# Patient Record
Sex: FEMALE | Race: WHITE | Employment: FULL TIME | URBAN - METROPOLITAN AREA
[De-identification: names, ages, dates, MRNs, and addresses within clinical notes are randomized per-mention and may not be internally consistent; named-entity substitution may affect disease eponyms.]

---

## 2022-07-02 RX ORDER — KETOCONAZOLE 20 MG/ML
SHAMPOO TOPICAL
COMMUNITY

## 2022-07-02 RX ORDER — CLOBETASOL PROPIONATE 0.05 G/100ML
SHAMPOO TOPICAL
COMMUNITY

## 2022-07-02 RX ORDER — ALPRAZOLAM 0.5 MG/1
TABLET ORAL
COMMUNITY
End: 2022-09-02 | Stop reason: ALTCHOICE

## 2022-07-02 RX ORDER — FAMOTIDINE 10 MG
TABLET ORAL
COMMUNITY

## 2022-07-29 PROBLEM — J45.909 ASTHMA: Status: ACTIVE | Noted: 2022-07-29

## 2022-07-29 PROBLEM — N95.1 MENOPAUSAL AND FEMALE CLIMACTERIC STATES: Status: ACTIVE | Noted: 2022-07-29

## 2022-07-29 PROBLEM — N95.9 MENOPAUSAL AND POSTMENOPAUSAL DISORDER: Status: ACTIVE | Noted: 2022-07-29

## 2022-07-29 PROBLEM — K59.00 CONSTIPATION: Status: ACTIVE | Noted: 2022-07-29

## 2022-07-29 PROBLEM — R92.8 ABNORMAL MAMMOGRAM: Status: ACTIVE | Noted: 2022-07-29

## 2022-07-29 PROBLEM — E03.9 HYPOTHYROIDISM: Status: ACTIVE | Noted: 2022-07-29

## 2022-07-29 PROBLEM — E06.3 HASHIMOTO'S THYROIDITIS: Status: ACTIVE | Noted: 2022-07-29

## 2022-07-29 PROBLEM — N60.99 DUCTAL HYPERPLASIA OF BREAST: Status: ACTIVE | Noted: 2022-07-29

## 2022-07-29 PROBLEM — E78.9 DISORDER OF LIPID METABOLISM: Status: ACTIVE | Noted: 2022-07-29

## 2022-07-29 PROBLEM — N95.1 PERIMENOPAUSE: Status: ACTIVE | Noted: 2022-07-29

## 2022-07-29 PROBLEM — J20.9 ACUTE BRONCHITIS: Status: ACTIVE | Noted: 2022-07-29

## 2022-07-29 PROBLEM — D03.9: Status: ACTIVE | Noted: 2022-07-29

## 2022-07-29 PROBLEM — G47.33 OSA (OBSTRUCTIVE SLEEP APNEA): Status: ACTIVE | Noted: 2022-07-29

## 2022-07-29 PROBLEM — M23.42 LOOSE BODY OF LEFT KNEE: Status: ACTIVE | Noted: 2022-07-29

## 2022-07-29 PROBLEM — F32.A DEPRESSION: Status: ACTIVE | Noted: 2022-07-29

## 2022-07-29 PROBLEM — C50.919 BREAST CANCER (HCC): Status: ACTIVE | Noted: 2022-07-29

## 2022-07-29 PROBLEM — D23.9 BENIGN NEOPLASM OF SKIN: Status: ACTIVE | Noted: 2022-07-29

## 2022-07-29 PROBLEM — J01.90 ACUTE SINUSITIS: Status: ACTIVE | Noted: 2022-07-29

## 2022-07-29 PROBLEM — F41.1 GENERALIZED ANXIETY DISORDER: Status: ACTIVE | Noted: 2022-07-29

## 2022-07-29 PROBLEM — E66.3 OVERWEIGHT: Status: ACTIVE | Noted: 2022-07-29

## 2022-07-29 PROBLEM — F32.A MILD DEPRESSION: Status: ACTIVE | Noted: 2022-07-29

## 2022-07-29 PROBLEM — G47.00 INSOMNIA: Status: ACTIVE | Noted: 2022-07-29

## 2022-07-29 PROBLEM — K22.9 ESOPHAGEAL DISORDER: Status: ACTIVE | Noted: 2022-07-29

## 2022-07-29 PROBLEM — R93.89 ABNORMAL FINDINGS ON DIAGNOSTIC IMAGING OF OTHER SPECIFIED BODY STRUCTURES: Status: ACTIVE | Noted: 2022-07-29

## 2022-07-29 PROBLEM — L98.9 DISORDER OF SKIN OR SUBCUTANEOUS TISSUE: Status: ACTIVE | Noted: 2022-07-29

## 2022-08-05 ENCOUNTER — TELEPHONE (OUTPATIENT)
Dept: ADMINISTRATIVE | Age: 57
End: 2022-08-05

## 2022-08-05 PROBLEM — C50.919 BREAST CANCER (HCC): Status: RESOLVED | Noted: 2022-07-29 | Resolved: 2022-08-05

## 2022-08-05 PROBLEM — R92.8 ABNORMAL MAMMOGRAM: Status: RESOLVED | Noted: 2022-07-29 | Resolved: 2022-08-05

## 2022-08-05 PROBLEM — N60.99 DUCTAL HYPERPLASIA OF BREAST: Status: RESOLVED | Noted: 2022-07-29 | Resolved: 2022-08-05

## 2022-08-05 PROBLEM — E78.9 DISORDER OF LIPID METABOLISM: Status: RESOLVED | Noted: 2022-07-29 | Resolved: 2022-08-05

## 2022-08-05 PROBLEM — M23.42 LOOSE BODY OF LEFT KNEE: Status: RESOLVED | Noted: 2022-07-29 | Resolved: 2022-08-05

## 2022-08-05 PROBLEM — G47.00 INSOMNIA: Status: RESOLVED | Noted: 2022-07-29 | Resolved: 2022-08-05

## 2022-08-05 PROBLEM — D23.9 BENIGN NEOPLASM OF SKIN: Status: RESOLVED | Noted: 2022-07-29 | Resolved: 2022-08-05

## 2022-08-05 PROBLEM — K22.9 ESOPHAGEAL DISORDER: Status: RESOLVED | Noted: 2022-07-29 | Resolved: 2022-08-05

## 2022-08-05 PROBLEM — N60.91 ATYPICAL DUCTAL HYPERPLASIA OF RIGHT BREAST: Status: ACTIVE | Noted: 2022-08-05

## 2022-08-05 PROBLEM — J01.90 ACUTE SINUSITIS: Status: RESOLVED | Noted: 2022-07-29 | Resolved: 2022-08-05

## 2022-08-05 PROBLEM — E03.9 HYPOTHYROIDISM: Status: RESOLVED | Noted: 2022-07-29 | Resolved: 2022-08-05

## 2022-08-05 PROBLEM — Z91.89 AT HIGH RISK FOR BREAST CANCER: Status: ACTIVE | Noted: 2022-08-05

## 2022-08-05 PROBLEM — N95.1 MENOPAUSAL AND FEMALE CLIMACTERIC STATES: Status: RESOLVED | Noted: 2022-07-29 | Resolved: 2022-08-05

## 2022-08-05 PROBLEM — N95.1 PERIMENOPAUSE: Status: RESOLVED | Noted: 2022-07-29 | Resolved: 2022-08-05

## 2022-08-05 PROBLEM — R93.89 ABNORMAL FINDINGS ON DIAGNOSTIC IMAGING OF OTHER SPECIFIED BODY STRUCTURES: Status: RESOLVED | Noted: 2022-07-29 | Resolved: 2022-08-05

## 2022-08-05 PROBLEM — F41.1 GENERALIZED ANXIETY DISORDER: Status: RESOLVED | Noted: 2022-07-29 | Resolved: 2022-08-05

## 2022-08-05 PROBLEM — F32.A MILD DEPRESSION: Status: RESOLVED | Noted: 2022-07-29 | Resolved: 2022-08-05

## 2022-08-05 PROBLEM — G47.33 OSA (OBSTRUCTIVE SLEEP APNEA): Status: RESOLVED | Noted: 2022-07-29 | Resolved: 2022-08-05

## 2022-08-05 PROBLEM — J20.9 ACUTE BRONCHITIS: Status: RESOLVED | Noted: 2022-07-29 | Resolved: 2022-08-05

## 2022-08-05 PROBLEM — E06.3 HASHIMOTO'S THYROIDITIS: Status: RESOLVED | Noted: 2022-07-29 | Resolved: 2022-08-05

## 2022-08-05 PROBLEM — L98.9 DISORDER OF SKIN OR SUBCUTANEOUS TISSUE: Status: RESOLVED | Noted: 2022-07-29 | Resolved: 2022-08-05

## 2022-08-05 PROBLEM — F32.A DEPRESSION: Status: RESOLVED | Noted: 2022-07-29 | Resolved: 2022-08-05

## 2022-08-05 PROBLEM — D03.9: Status: RESOLVED | Noted: 2022-07-29 | Resolved: 2022-08-05

## 2022-08-05 PROBLEM — J45.909 ASTHMA: Status: RESOLVED | Noted: 2022-07-29 | Resolved: 2022-08-05

## 2022-08-05 PROBLEM — E66.3 OVERWEIGHT: Status: RESOLVED | Noted: 2022-07-29 | Resolved: 2022-08-05

## 2022-08-05 PROBLEM — N95.9 MENOPAUSAL AND POSTMENOPAUSAL DISORDER: Status: RESOLVED | Noted: 2022-07-29 | Resolved: 2022-08-05

## 2022-08-05 NOTE — TELEPHONE ENCOUNTER
Hollie Burks from [facility] called because the new prescription the patient got does not seem to have the right quantity, she said they received it as 7 for quantity and a bunch of refills and would like for the script to be sent over again please

## 2022-08-08 PROBLEM — Z12.31 ENCOUNTER FOR SCREENING MAMMOGRAM FOR HIGH-RISK PATIENT: Status: ACTIVE | Noted: 2022-08-08

## 2022-08-08 RX ORDER — VILAZODONE HYDROCHLORIDE 20 MG/1
TABLET ORAL
Qty: 30 TABLET | Refills: 2 | Status: SHIPPED | OUTPATIENT
Start: 2022-08-08 | End: 2022-10-24 | Stop reason: SDUPTHER

## 2022-09-07 PROBLEM — Z12.31 ENCOUNTER FOR SCREENING MAMMOGRAM FOR HIGH-RISK PATIENT: Status: RESOLVED | Noted: 2022-08-08 | Resolved: 2022-09-07

## 2022-11-03 PROBLEM — M26.629 ARTHRALGIA OF TEMPOROMANDIBULAR JOINT: Status: ACTIVE | Noted: 2022-11-03

## 2022-11-03 PROBLEM — N63.0 BREAST LUMP: Status: ACTIVE | Noted: 2022-11-03

## 2022-11-03 PROBLEM — G47.30 SLEEP APNEA: Status: ACTIVE | Noted: 2022-11-03

## 2022-11-03 PROBLEM — Z98.890 POSTOPERATIVE NAUSEA AND VOMITING: Status: ACTIVE | Noted: 2022-11-03

## 2022-11-03 PROBLEM — J45.909 ASTHMA: Status: ACTIVE | Noted: 2022-11-03

## 2022-11-03 PROBLEM — F41.9 ANXIETY: Status: ACTIVE | Noted: 2022-11-03

## 2022-11-03 PROBLEM — K21.9 GASTROESOPHAGEAL REFLUX DISEASE: Status: ACTIVE | Noted: 2022-11-03

## 2022-11-03 PROBLEM — N95.1 MENOPAUSAL SYNDROME: Status: ACTIVE | Noted: 2022-11-03

## 2022-11-03 PROBLEM — F32.A DEPRESSIVE DISORDER: Status: ACTIVE | Noted: 2022-11-03

## 2022-11-03 PROBLEM — R11.2 POSTOPERATIVE NAUSEA AND VOMITING: Status: ACTIVE | Noted: 2022-11-03

## 2022-11-03 PROBLEM — M23.40 LOOSE BODY IN KNEE: Status: ACTIVE | Noted: 2022-11-03

## 2022-11-03 PROBLEM — J00 COMMON COLD: Status: ACTIVE | Noted: 2022-11-03
